# Patient Record
Sex: FEMALE | Race: WHITE | Employment: FULL TIME | URBAN - METROPOLITAN AREA
[De-identification: names, ages, dates, MRNs, and addresses within clinical notes are randomized per-mention and may not be internally consistent; named-entity substitution may affect disease eponyms.]

---

## 2017-09-14 ENCOUNTER — HOSPITAL ENCOUNTER (OUTPATIENT)
Dept: SURGERY | Age: 42
Discharge: HOME | End: 2017-09-14
Attending: OBSTETRICS & GYNECOLOGY | Admitting: OBSTETRICS & GYNECOLOGY

## 2017-09-14 VITALS
DIASTOLIC BLOOD PRESSURE: 61 MMHG | HEART RATE: 83 BPM | OXYGEN SATURATION: 100 % | SYSTOLIC BLOOD PRESSURE: 115 MMHG | RESPIRATION RATE: 12 BRPM | BODY MASS INDEX: 22.66 KG/M2 | HEIGHT: 66 IN | TEMPERATURE: 97.3 F | WEIGHT: 141 LBS

## 2017-09-14 LAB — PREGNANCY, URINE: NEGATIVE

## 2017-09-14 RX ORDER — MORPHINE SULFATE 2 MG/ML
2 INJECTION, SOLUTION INTRAMUSCULAR; INTRAVENOUS EVERY 5 MIN PRN
Status: DISCONTINUED | OUTPATIENT
Start: 2017-09-14 | End: 2017-09-15 | Stop reason: HOSPADM

## 2017-09-14 RX ORDER — ONDANSETRON 2 MG/ML
4 INJECTION INTRAMUSCULAR; INTRAVENOUS
Status: ACTIVE | OUTPATIENT
Start: 2017-09-14 | End: 2017-09-14

## 2017-09-14 RX ORDER — SODIUM CHLORIDE 0.9 % (FLUSH) 0.9 %
10 SYRINGE (ML) INJECTION EVERY 12 HOURS SCHEDULED
Status: DISCONTINUED | OUTPATIENT
Start: 2017-09-14 | End: 2017-09-15 | Stop reason: HOSPADM

## 2017-09-14 RX ORDER — OXYCODONE HYDROCHLORIDE AND ACETAMINOPHEN 5; 325 MG/1; MG/1
2 TABLET ORAL PRN
Status: DISPENSED | OUTPATIENT
Start: 2017-09-14 | End: 2017-09-14

## 2017-09-14 RX ORDER — MEPERIDINE HYDROCHLORIDE 50 MG/ML
12.5 INJECTION INTRAMUSCULAR; INTRAVENOUS; SUBCUTANEOUS EVERY 5 MIN PRN
Status: COMPLETED | OUTPATIENT
Start: 2017-09-14 | End: 2017-09-14

## 2017-09-14 RX ORDER — LABETALOL HYDROCHLORIDE 5 MG/ML
5 INJECTION, SOLUTION INTRAVENOUS EVERY 10 MIN PRN
Status: DISCONTINUED | OUTPATIENT
Start: 2017-09-14 | End: 2017-09-15 | Stop reason: HOSPADM

## 2017-09-14 RX ORDER — LIDOCAINE HYDROCHLORIDE 10 MG/ML
1 INJECTION, SOLUTION EPIDURAL; INFILTRATION; INTRACAUDAL; PERINEURAL
Status: ACTIVE | OUTPATIENT
Start: 2017-09-14 | End: 2017-09-14

## 2017-09-14 RX ORDER — SODIUM CHLORIDE, SODIUM LACTATE, POTASSIUM CHLORIDE, CALCIUM CHLORIDE 600; 310; 30; 20 MG/100ML; MG/100ML; MG/100ML; MG/100ML
INJECTION, SOLUTION INTRAVENOUS CONTINUOUS
Status: DISCONTINUED | OUTPATIENT
Start: 2017-09-14 | End: 2017-09-15 | Stop reason: HOSPADM

## 2017-09-14 RX ORDER — IBUPROFEN 600 MG/1
600 TABLET ORAL EVERY 6 HOURS PRN
Qty: 25 TABLET | Refills: 1 | Status: SHIPPED | OUTPATIENT
Start: 2017-09-14

## 2017-09-14 RX ORDER — DIPHENHYDRAMINE HYDROCHLORIDE 50 MG/ML
12.5 INJECTION INTRAMUSCULAR; INTRAVENOUS
Status: ACTIVE | OUTPATIENT
Start: 2017-09-14 | End: 2017-09-14

## 2017-09-14 RX ORDER — MORPHINE SULFATE 2 MG/ML
1 INJECTION, SOLUTION INTRAMUSCULAR; INTRAVENOUS EVERY 5 MIN PRN
Status: DISCONTINUED | OUTPATIENT
Start: 2017-09-14 | End: 2017-09-15 | Stop reason: HOSPADM

## 2017-09-14 RX ORDER — OXYCODONE HYDROCHLORIDE AND ACETAMINOPHEN 5; 325 MG/1; MG/1
1 TABLET ORAL PRN
Status: ACTIVE | OUTPATIENT
Start: 2017-09-14 | End: 2017-09-14

## 2017-09-14 RX ORDER — SODIUM CHLORIDE 0.9 % (FLUSH) 0.9 %
10 SYRINGE (ML) INJECTION PRN
Status: DISCONTINUED | OUTPATIENT
Start: 2017-09-14 | End: 2017-09-15 | Stop reason: HOSPADM

## 2017-09-14 RX ORDER — HYDROCODONE BITARTRATE AND ACETAMINOPHEN 5; 325 MG/1; MG/1
1 TABLET ORAL EVERY 6 HOURS PRN
Qty: 8 TABLET | Refills: 0 | Status: SHIPPED | OUTPATIENT
Start: 2017-09-14 | End: 2017-09-21

## 2017-09-14 RX ORDER — HYDRALAZINE HYDROCHLORIDE 20 MG/ML
5 INJECTION INTRAMUSCULAR; INTRAVENOUS
Status: DISCONTINUED | OUTPATIENT
Start: 2017-09-14 | End: 2017-09-15 | Stop reason: HOSPADM

## 2017-09-14 RX ADMIN — SODIUM CHLORIDE, SODIUM LACTATE, POTASSIUM CHLORIDE, CALCIUM CHLORIDE: 600; 310; 30; 20 INJECTION, SOLUTION INTRAVENOUS at 06:45

## 2017-09-14 RX ADMIN — MEPERIDINE HYDROCHLORIDE 12.5 MG: 50 INJECTION INTRAMUSCULAR; INTRAVENOUS; SUBCUTANEOUS at 08:55

## 2017-09-14 RX ADMIN — MEPERIDINE HYDROCHLORIDE 12.5 MG: 50 INJECTION INTRAMUSCULAR; INTRAVENOUS; SUBCUTANEOUS at 08:45

## 2017-09-14 ASSESSMENT — PAIN SCALES - GENERAL
PAINLEVEL_OUTOF10: 0

## 2017-09-14 ASSESSMENT — PAIN - FUNCTIONAL ASSESSMENT: PAIN_FUNCTIONAL_ASSESSMENT: 0-10

## 2018-02-03 ENCOUNTER — HOSPITAL ENCOUNTER (OUTPATIENT)
Dept: MAMMOGRAPHY | Age: 43
Discharge: HOME | End: 2018-02-03
Attending: OBSTETRICS & GYNECOLOGY | Admitting: OBSTETRICS & GYNECOLOGY

## 2018-02-03 DIAGNOSIS — Z12.31 ENCOUNTER FOR SCREENING MAMMOGRAM FOR BREAST CANCER: ICD-10-CM

## 2019-03-13 ENCOUNTER — HOSPITAL ENCOUNTER (OUTPATIENT)
Dept: WOMENS IMAGING | Age: 44
Discharge: HOME | End: 2019-03-13
Payer: COMMERCIAL

## 2019-03-13 DIAGNOSIS — Z12.31 ENCOUNTER FOR SCREENING MAMMOGRAM FOR BREAST CANCER: ICD-10-CM

## 2019-03-13 PROCEDURE — 77067 SCR MAMMO BI INCL CAD: CPT

## 2020-11-24 ENCOUNTER — HOSPITAL ENCOUNTER (OUTPATIENT)
Dept: WOMENS IMAGING | Age: 45
Discharge: HOME | End: 2020-11-24
Payer: COMMERCIAL

## 2020-11-24 PROCEDURE — 77063 BREAST TOMOSYNTHESIS BI: CPT

## 2020-11-25 ENCOUNTER — TELEPHONE (OUTPATIENT)
Dept: WOMENS IMAGING | Age: 45
End: 2020-11-25

## 2020-11-25 NOTE — TELEPHONE ENCOUNTER
Reviewed screening mammogram results with patient. Patient verbalized understanding. Patient has been scheduled for additional imaging.     Kian Ruff RN

## 2020-12-02 ENCOUNTER — HOSPITAL ENCOUNTER (OUTPATIENT)
Dept: WOMENS IMAGING | Age: 45
Discharge: HOME | End: 2020-12-02
Payer: COMMERCIAL

## 2020-12-02 ENCOUNTER — HOSPITAL ENCOUNTER (OUTPATIENT)
Dept: WOMENS IMAGING | Age: 45
End: 2020-12-02
Payer: COMMERCIAL

## 2020-12-02 PROCEDURE — G0279 TOMOSYNTHESIS, MAMMO: HCPCS

## 2021-02-04 ENCOUNTER — HOSPITAL ENCOUNTER (OUTPATIENT)
Dept: PHYSICAL THERAPY | Age: 46
Setting detail: THERAPIES SERIES
Discharge: HOME | End: 2021-02-04
Payer: COMMERCIAL

## 2021-02-04 PROCEDURE — 97530 THERAPEUTIC ACTIVITIES: CPT

## 2021-02-04 PROCEDURE — 97162 PT EVAL MOD COMPLEX 30 MIN: CPT

## 2021-02-04 PROCEDURE — 97110 THERAPEUTIC EXERCISES: CPT

## 2021-02-04 NOTE — FLOWSHEET NOTE
[facility]  [facility]. and Therapy, [facility], [address] [facility], [address]  Phone: [phone]  Fax [phone]        Physical Therapy Daily Treatment Note    Date:  2021    Patient Name:  Mani Berrios   \"Yesi\"  :  1975  MRN: [id number]  Restrictions/Precautions:  Universal   Medical/Treatment Diagnosis Information:  · Diagnosis: Diastasis Recti  Insurance/Certification information:  PT Insurance Information: [place]  Physician Information:  Referring Practitioner: Trudi Hwang MD  Plan of care signed (Y/N):  N, faxed  Visit# / total visits:       G-Code (if applicable):          PFDI: 74/300    Medicare Cap (if applicable):  n/a = total amount used, updated 2021    Time in:   9:05am     Timed Treatment: 70min. Total Treatment Time:  85min.  ________________________________________________________________________________________    Pain Level:    /10  SUBJECTIVE:  See eval    OBJECTIVE:     Exercise (TE) Resistance/Repetitions Other comments            PF strengthening        Short hold: (1sec) 10 times       Long hold: (10sec) 10 times       Hook-lying TA Until achieved  [id number]. Other Treatment:   TA:  Bladder re-training/education:     Bladder Diary: patient educated on importance of filling out bladder diary at home, complete with fluid intake, voids, and leakage when applicable. Voiding: patient educated on normal voiding and urinary cycle and the physiology of bladder control muscles and pelvic floor. The patient was educated on bladder dysfunction as well as IGNACIO with urethral involvement. The patient was also educated on prolapse and it's affect on urination and pain.     Dietary:     Other: core stabilization, abdominal stabilization, diastasis recti  Manual Treatments:  --       Modalities: --     Test/Measurements:  See eval           ASSESSMENT: see eval

## 2021-02-04 NOTE — FLOWSHEET NOTE
[facility], [facility], [address], [address]   Phone: [phone]  Fax [phone]      Physical Therapy Pelvic Floor Evaluation    Thank you for this referral, please sign and return fax to [phone]    If you have any questions or concerns, please don't hesitate to call. Thank you for your referral.    Physician Signature:________________________________Date:__________________  By signing above, therapists plan is approved by physician    [id number]     Patient: Darnell Meza \"Yesi\" : 1975  MRN: [id number]    This is a 42 y.o. female referred by Ganesh Beckett MD to [place] [address] with a primary diagnosis of: Diastasis Recti    Onset Date: 2013  Next MD appt: PRN    C-SSRS Triggered by Intake questionnaire (Past 2 wk assessment):   [x] No, Questionnaire did not trigger screening.   [] Yes, Patient intake triggered further evaluation      [] C-SSRS Screening completed  [] PCP notified via Plan of Care  [] Emergency services notified     Subjective:   Patient history: The patient reports she has had 2 vaginal births, noticed separation about 2 years after the last baby was born in . Reports \"I just know I still look pregnant\". Reports she feels weak and has occasional urinary leakage. Reports she feels some pressure and uncomfortable in the vagina. States she has some difficulty with constipation and decreased ease of bowel movements. Denies intercourse pain. States she regularly works out and has some back discomfort. Notices decreased abdominal strength overall. Denies history of sexual abuse/trauma. 2nd person present during evaluation today? Declined  What do you feel is your problem? weakness  When did you problem first start? About 2 years after the last baby  Has your problem been getting worse, stay the same, or getting better?  About the same Have you ever had treatment for this problem? no    4 week or greater of failed trial of PFPT program? no   PFPT program as defined by \"Completing 4 weeks of an ordered plan of pelvic muscle exercises designed to increase periurethral muscle strength\". Urinary frequency? Daytime? YES Nighttime? NO  Bowel problems? Some constipation   Urinary or bowel leakage? Some urinary   Leakage frequency? Occasionally   Protection: none      Pregnancy:   # of pregnancies: 2   # of deliveries: 2 vaginal, (8lbs, 15oz) (9lbs, 15oz)   Episiotomy: yes   Normal post- healing? yes  Are you having regular menstrual cycles? yes  Date of last pap smear?     Pain: current level of pain: 0      Sensation/neurovascular: WNL    Diagnostic tests: none to date     Precautions/Contraindications: universal     Past Medical History:   Diagnosis Date    Allergic rhinitis      Past Surgical History:   Procedure Laterality Date    CATARACT REMOVAL WITH IMPLANT Left     born with cataract    DILATION AND CURETTAGE OF UTERUS  2017    DILATATION AND CURETTAGE, HYSTEROSCOPY WITH MYOSURE ENDOMETRIAL POLYPECTOMY AND NOVASURE ENDOMETRIAL ABLATION    WISDOM TOOTH EXTRACTION       Not in a hospital admission.     Current Outpatient Medications:     ibuprofen (ADVIL;MOTRIN) 600 MG tablet, Take 1 tablet by mouth every 6 hours as needed for Pain, Disp: 25 tablet, Rfl: 1    ferrous sulfate 325 (65 Fe) MG tablet, Take 325 mg by mouth daily (with breakfast), Disp: , Rfl:     fluticasone (VERAMYST) 27.5 MCG/SPRAY nasal spray, 2 sprays by Nasal route daily, Disp: , Rfl:     guaiFENesin 400 MG tablet, Take 400 mg by mouth 2 times daily as needed for Cough, Disp: , Rfl:   No Known Allergies      Objective:    PFDI score: 74/300    Observation:   Posture: WFL   Gait analysis: WFL  Lumbar Mobility: WNL  Scar Location/Mobility: n/a  Diastasis Recti (in finger width): Above: 0  At umbilicus: 3 to 2  Below:2 to 1  Abdominal TA strength: 3/5 Special Tests:  Sacroiliac Test:   Gaenslen: negative      Lumbar Spine:   Slump test: negative       Neuro:   Sensation: (B) UEs: WNL   Sensation: (B) LEs: WNL   Anal Sensation: intact to light touch   Reflexes:     Anal: present    Cough: present     Pelvic Floor Exam  External:  Skin Condition: WNL  Sensation: intact  Palpation: no point tenderness   Tone: hypotonic  Perineal Body Mobility:    Voluntary PFM Contraction: present (normal)   Voluntary PFM Relaxation: present (normal)   Involuntary PFM Contraction/Cough Reflex: present (normal)   Involuntary PFM Relaxation: present (normal)  Perineal Body Descent:   Rest: supported   Bearing down: absent (normal-cephalad)    Internal:  Sensation: intact  Palpation: no point tenderness   Vaginal Vault Size: WNL  PERFECT:   Power: 4/5   Endurance: 10sec. Decreasing strength at 7sec. Repetitions: 10   Fast Twitch: 7   Elevation: present   Co-contraction: NOT PRESENT   Timing: present   OI strength: 4/5 B  Pelvic Organ Prolapse: Grade 1 cystocele, Grade 1 rectocele        Treatment: see flowsheet    Assessment:  Impression: The patient is a 41yo female referred to PT due to diastasis recti. The patient demonstrated moderate diastasis recti upon examination this date. The patient is standing in anterior pelvic tilt posturing. The patient demonstrated some decreased strength and endurance in pelvic floor, and no co-contraction with pelvic floor contraction. The patient demonstrated and verbalized good understanding of plan of care and should improve with PT and HEP compliance. Time Frame: 8 weeks. Rehab Potential: good    Goals:  1. Patient will demonstrate independence with HEP to self-manage symptoms. 2. Patient will improve diastasis recti by 1 finger width at umbilicus level to improve abdominal stability. 3. Patient will improve pelvic floor endurance to 10 seconds without decreasing strength to improve continence.

## 2021-02-11 ENCOUNTER — HOSPITAL ENCOUNTER (OUTPATIENT)
Dept: PHYSICAL THERAPY | Age: 46
Setting detail: THERAPIES SERIES
Discharge: HOME | End: 2021-02-11
Payer: COMMERCIAL

## 2021-02-11 PROCEDURE — 97110 THERAPEUTIC EXERCISES: CPT

## 2021-02-11 PROCEDURE — 97530 THERAPEUTIC ACTIVITIES: CPT

## 2021-02-11 NOTE — FLOWSHEET NOTE
[facility], [facility], [address], [address]  Phone: [phone]  Fax [phone]        Physical Therapy Daily Treatment Note    Date:  2021    Patient Name:  Kamryn Gramajo   \"Yesi\"  :  1975  MRN: [id number]  Restrictions/Precautions:  Universal   Medical/Treatment Diagnosis Information:  · Diagnosis: Diastasis Recti  Insurance/Certification information:  PT Insurance Information: [place]  Physician Information:  Referring Practitioner: Joe Perla MD  Plan of care signed (Y/N):  Y  Visit# / total visits:       G-Code (if applicable):          PFDI: 74/300    Medicare Cap (if applicable):  n/a = total amount used, updated 2021    Time in:   3:30pm     Timed Treatment: 45min. Total Treatment Time:  45min.  ________________________________________________________________________________________    Pain Level:    0/10  SUBJECTIVE:  Patient reports \"I feel like I am having trouble finding time to do the exercises\". OBJECTIVE:     Exercise (TE) Resistance/Repetitions Other comments            PF strengthening        Short hold: (1sec) 10 times       Long hold: (10sec) 10 times       Hook-lying TA Until achieved  [id number]. KFO: x10  March: x10 W/ BP cuff          PF + hip ABD x10 w/ blue tband    PF + hip ADD x10 w/ ball squeeze                      Access Code: [email or web address]   URL: [email or web address]   Date: 2021   Prepared by: Jamal Goodson     Exercises   Seated Pelvic Floor Contraction - 10 reps - 1 sets - 1sec. hold - 1x daily - 7x weekly   Seated Pelvic Floor Contraction - 10 reps - 1 sets - 5-10sec. hold - 1x daily - 7x weekly   Seated Pelvic Floor Contraction with Isometric Hip Adduction - 10 reps - 1 sets - 1x daily - 7x weekly   Seated Pelvic Floor Contraction with Hip Abduction and Resistance Loop - 10 reps - 1 sets - 1x daily - 7x weekly Hooklying Transversus Abdominis Palpation - 10 reps - 1 sets - 10sec. hold - 1x daily - 7x weekly   Bent Knee Fallouts - 10 reps - 1 sets - 1x daily - 7x weekly   Supine March - 10 reps - 1 sets - 1x daily - 7x weekly     Other Treatment:   TA:  Bladder re-training/education:     Bladder Diary: 8-12 voids/day, 0 leaks, 1-2 BM/day    Voiding:advised to continue to void appropriate amount of times/day    Dietary: education on fiber intake, to increase to improve consistency of bowel movements to prevent constipation and to improve prolapse management. Other: core stabilization, abdominal stabilization, diastasis recti  Manual Treatments:  --       Modalities: --     Test/Measurements:  See eval           ASSESSMENT: The patient is progressing nicely with PT. The patient performed above TE well with no increase in pain. Required verbal and tactile cues for correct activation. Gave patient updated HEP. No pain reported at end of PT session. Patient will follow up in 2 weeks. Treatment/Activity Tolerance:   [x] Patient tolerated treatment well [] Patient limited by fatique  [] Patient limited by pain [] Patient limited by other medical complications  [] Other:     Goals:      Time Frame: 8 weeks. Rehab Potential: good     Goals:  1. Patient will demonstrate independence with HEP to self-manage symptoms. 2. Patient will improve diastasis recti by 1 finger width at umbilicus level to improve abdominal stability. 3. Patient will improve pelvic floor endurance to 10 seconds without decreasing strength to improve continence. 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 5. Patient will improve PFDI score by at least 25 points demonstrating improved pelvic floor functioning and quality of life.          Plan: [x] Continue per plan of care [] Alter current plan (see comments) [] Plan of care initiated [] Hold pending MD visit [] Discharge      Plan for Next Session: review diary, review HEP, advance as needed     Re-Certification Due Date:   Visit 12      Signature:  Shahnaz Phillips PT, DPT

## 2021-02-16 ENCOUNTER — APPOINTMENT (OUTPATIENT)
Dept: PHYSICAL THERAPY | Age: 46
End: 2021-02-16
Payer: COMMERCIAL

## 2021-02-23 ENCOUNTER — HOSPITAL ENCOUNTER (OUTPATIENT)
Dept: PHYSICAL THERAPY | Age: 46
Setting detail: THERAPIES SERIES
Discharge: HOME | End: 2021-02-23
Payer: COMMERCIAL

## 2021-02-23 PROCEDURE — 97140 MANUAL THERAPY 1/> REGIONS: CPT

## 2021-02-23 PROCEDURE — 97110 THERAPEUTIC EXERCISES: CPT

## 2021-02-23 PROCEDURE — 97530 THERAPEUTIC ACTIVITIES: CPT

## 2021-02-23 NOTE — FLOWSHEET NOTE
sets - 1x daily - 7x weekly   Hooklying Transversus Abdominis Palpation - 10 reps - 1 sets - 10sec. hold - 1x daily - 7x weekly   Bent Knee Fallouts - 10 reps - 1 sets - 1x daily - 7x weekly   Supine March - 10 reps - 1 sets - 1x daily - 7x weekly     Other Treatment:   TA:  Bladder re-training/education:     Bladder Diary: 8-12 voids/day, 0 leaks, 1-2 BM/day    Voiding:advised to continue to void appropriate amount of times/day    Dietary: education on fiber intake, to increase to improve consistency of bowel movements to prevent constipation and to improve prolapse management. Other: core stabilization, abdominal stabilization, diastasis recti, posturing cues in standing/sitting/laying     Manual Treatments: tactile and verbal feedback with pelvic floor contractions. Modalities: --     Test/Measurements:  See eval           ASSESSMENT: The patient is progressing nicely with PT. The patient demonstrated a fair co-contraction today upon examination. The patient performed above TE well with no increase in pain. Required verbal and tactile cues for correct activation. Gave patient updated HEP. No pain reported at end of PT session. Patient will follow up in 3 weeks. Treatment/Activity Tolerance:   [x] Patient tolerated treatment well [] Patient limited by fatique  [] Patient limited by pain [] Patient limited by other medical complications  [] Other:     Goals:      Time Frame: 8 weeks. Rehab Potential: good     Goals:  1. Patient will demonstrate independence with HEP to self-manage symptoms. 2. Patient will improve diastasis recti by 1 finger width at umbilicus level to improve abdominal stability. 3. Patient will improve pelvic floor endurance to 10 seconds without decreasing strength to improve continence. 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   5. Patient will improve PFDI score by at least 25 points demonstrating improved pelvic floor functioning and quality of life.          Plan: [x] Continue per plan of care [] Alter current plan (see comments)   [] Plan of care initiated [] Hold pending MD visit [] Discharge      Plan for Next Session: review/advance HEP, advance as needed     Re-Certification Due Date:   Visit 16      Signature:  Kallie Truong, PT, DPT

## 2021-03-09 ENCOUNTER — APPOINTMENT (OUTPATIENT)
Dept: PHYSICAL THERAPY | Age: 46
End: 2021-03-09
Payer: COMMERCIAL

## 2021-03-30 ENCOUNTER — HOSPITAL ENCOUNTER (OUTPATIENT)
Dept: PHYSICAL THERAPY | Age: 46
Setting detail: THERAPIES SERIES
Discharge: HOME | End: 2021-03-30
Payer: COMMERCIAL

## 2021-03-30 PROCEDURE — 97530 THERAPEUTIC ACTIVITIES: CPT

## 2021-03-30 PROCEDURE — 97110 THERAPEUTIC EXERCISES: CPT

## 2021-03-30 NOTE — FLOWSHEET NOTE
[facility], [facility], [address], [address]  Phone: [phone]  Fax [phone]        Physical Therapy Daily Treatment Note    Date:  3/30/2021    Patient Name:  [place]   \"Yesi\"  :  1975  MRN: [id number]  Restrictions/Precautions:  Universal   Medical/Treatment Diagnosis Information:  · Diagnosis: Diastasis Recti  Insurance/Certification information:  PT Insurance Information: [place]  Physician Information:  Referring Practitioner: Maranda Ortiz MD  Plan of care signed (Y/N):  Y  Visit# / total visits:       G-Code (if applicable):          PFDI: 74/300    Medicare Cap (if applicable):  n/a = total amount used, updated 3/30/2021    Time in:  4:20pm     Timed Treatment: 50min. Total Treatment Time:  50min.  ________________________________________________________________________________________    Pain Level:    0/10  SUBJECTIVE:  Patient reports \"I am doing better, I just am not getting my exercises in all of the days\". *re-eval next visit*    OBJECTIVE:     Exercise (TE) Resistance/Repetitions Other comments            PF strengthening        Short hold: (1sec) 10 times       Long hold: (10sec) 10 times       Hook-lying TA in 90/90 Until achieved  [id number]. KFO: x10  March: x10           PF + hip ABD x10 w/ blue tband    PF + hip ADD x10 w/ ball squeeze         Ryleehell x10    Access Code: [email or web address] URL: [email or web address]/Date: 1Prepared by: Ye Candelaria   Seated Pelvic Floor Contraction - 1 x daily - 7 x weekly - 10 reps - 1 sets - 1sec. hold   Seated Pelvic Floor Contraction - 1 x daily - 7 x weekly - 10 reps - 1 sets - 5-10sec. hold   Seated Pelvic Floor Contraction with Isometric Hip Adduction - 1 x daily - 7 x weekly - 10 reps - 1 sets   Seated Pelvic Floor Contraction with Hip Abduction and Resistance Loop - 1 x daily - 7 x weekly - 10 reps - 1 sets   Hooklying Transversus Abdominis Palpation - 1 x daily - 7 x weekly - 10 reps - 1 sets - 10sec. hold   Bent Knee Fallouts - 1 x daily - 7 x weekly - 10 reps - 1 sets   Supine March - 1 x daily - 7 x weekly - 10 reps - 1 sets   Supine 90/90 Abdominal Bracing - 1 x daily - 7 x weekly - 10 reps - 3 sets   Straight Leg Raise with Opposite Hip and Knee Flexion - 1 x daily - 7 x weekly - 10 reps - 3 sets      Other Treatment:   TA:  Bladder re-training/education:         Other: core stabilization, abdominal stabilization, diastasis recti, posturing cues in standing/sitting/laying, modifications of current workouts and protection of pelvic floor/core/back. Manual Treatments:      Modalities: --     Test/Measurements:  See eval           ASSESSMENT: The patient is progressing nicely with PT, adapted nicely to increased difficulty with exercises. The patient demonstrated a fair co-contraction today upon examination, though with verbal and tactile feedback, patient improved. The patient performed above TE well with no increase in pain. Gave patient updated HEP. No pain reported at end of PT session. Patient will follow up in 3 weeks. *re-eval next visit*    Treatment/Activity Tolerance:   [x] Patient tolerated treatment well [] Patient limited by fatique  [] Patient limited by pain [] Patient limited by other medical complications  [] Other:     Goals:      Time Frame: 8 weeks. Rehab Potential: good     Goals:  1. Patient will demonstrate independence with HEP to self-manage symptoms. 2. Patient will improve diastasis recti by 1 finger width at umbilicus level to improve abdominal stability. 3. Patient will improve pelvic floor endurance to 10 seconds without decreasing strength to improve continence. 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   5. Patient will improve PFDI score by at least 25 points demonstrating

## 2021-04-28 ENCOUNTER — HOSPITAL ENCOUNTER (OUTPATIENT)
Dept: PHYSICAL THERAPY | Age: 46
Setting detail: THERAPIES SERIES
Discharge: HOME | End: 2021-04-28
Payer: COMMERCIAL

## 2021-04-28 PROCEDURE — 97110 THERAPEUTIC EXERCISES: CPT

## 2021-04-28 PROCEDURE — 97530 THERAPEUTIC ACTIVITIES: CPT

## 2021-04-28 PROCEDURE — 97140 MANUAL THERAPY 1/> REGIONS: CPT

## 2021-04-28 NOTE — FLOWSHEET NOTE
[facility]  [address] [place] [address], [address] [place] [address] [address]  Phone: [phone]   Fax: [phone]        Physical Therapy Daily Treatment Note, Re-assessment, D/C summary    Date:  2021    Patient Name:  Jeremías Scott   \"Yesi\"  :  1975  MRN: [id number]  Restrictions/Precautions:  Universal   Medical/Treatment Diagnosis Information:  · Diagnosis: Diastasis Recti  Insurance/Certification information:  PT Insurance Information: [place]  Physician Information:  Referring Practitioner: Kizzy Villalpando MD  Plan of care signed (Y/N):  Y  Visit# / total visits:  5 (2021 to 2021)     G-Code (if applicable):          PFDI: 47/300 (was 74/300)    Medicare Cap (if applicable):  n/a = total amount used, updated 2021    Time in:  3:45pm     Timed Treatment: 45min. Total Treatment Time:  45min.  ________________________________________________________________________________________    Pain Level:    0/10  SUBJECTIVE:  Patient reports \"I haven't really had the time to do all of my exercises, I feel like I am straining sometimes\". \"I do feel like the diastasis is getting a little better\". \"I do think I am ready to do things on my own\". Reports compliance with HEP. OBJECTIVE:     Exercise (TE) Resistance/Repetitions Other comments            PF strengthening        Short hold: (1sec) 10 times       Long hold: (10sec) 10 times       Hook-lying TA in 90/90 Until achieved  [id number]. KFO: x10  March: x10           PF + hip ABD x10 w/ blue tband    PF + hip ADD x10 w/ ball squeeze         Day[place]al x10    Access Code: [email or web address] URL: [email or web address]. com/Date: 1Prepared by: Darcel Cooks   Seated Pelvic Floor Contraction - 1 x daily - 7 x weekly - 10 reps - 1 sets - 1sec. hold   Seated Pelvic Floor Contraction - 1 x daily - 7 x weekly - 10 reps - 1 sets - 5-10sec. hold   Seated Pelvic Floor Contraction with Isometric Hip Adduction - 1 x daily - 7 x weekly - 10 reps - 1 sets   Seated Pelvic Floor Contraction with Hip Abduction and Resistance Loop - 1 x daily - 7 x weekly - 10 reps - 1 sets   Hooklying Transversus Abdominis Palpation - 1 x daily - 7 x weekly - 10 reps - 1 sets - 10sec. hold   Bent Knee Fallouts - 1 x daily - 7 x weekly - 10 reps - 1 sets   Supine March - 1 x daily - 7 x weekly - 10 reps - 1 sets   Supine 90/90 Abdominal Bracing - 1 x daily - 7 x weekly - 10 reps - 3 sets   Straight Leg Raise with Opposite Hip and Knee Flexion - 1 x daily - 7 x weekly - 10 reps - 3 sets      Other Treatment:   TA:  Bladder re-training/education:         Other: core stabilization, abdominal stabilization, diastasis recti, posturing cues in standing/sitting/laying, modifications of current workouts and protection of pelvic floor/core/back, pelvic floor maintenance. Manual Treatments:      Modalities: --     Test/Measurements:    Diastasis Recti (in finger width): Above: 0  At umbilicus: 2 to 1.5 (was 3 to 2)  Below: 1 to .5 (was 2 to 1)    Sensation: intact  Palpation: no point tenderness   Vaginal Vault Size: WNL  PERFECT:              Power: 4/5              Endurance: 10sec. (was 10sec. Decreasing strength at 7sec.)              Repetitions: 10              Fast Twitch: 10 (was 7)              Elevation: present              Co-contraction: present (was NOT PRESENT)              Timing: present   OI strength: 4/5 B  Pelvic Organ Prolapse: Grade 1 cystocele, Grade 1 rectocele          ASSESSMENT: After 5 PFPT visits, the patient has made significant progress. The patient demonstrated improved pelvic floor strength, endurance and overall coordination. The patient also demonstrated improved diastasis and abdominal strength/control. The patient will continue with above HEP and follow up with MD/PT if further problems arise.      Treatment/Activity Tolerance:   [x] Patient tolerated treatment well [] Patient limited by linda  [] Patient limited by pain [] Patient limited by other medical complications  [] Other:     Goals:      Time Frame: 8 weeks. Rehab Potential: good     Goals:  1. Patient will demonstrate independence with HEP to self-manage symptoms. (MET)  2. Patient will improve diastasis recti by 1 finger width at umbilicus level to improve abdominal stability. (MET)  3. Patient will improve pelvic floor endurance to 10 seconds without decreasing strength to improve continence. 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 (MET)  5.  Patient will improve PFDI score by at least 25 points demonstrating improved pelvic floor functioning and quality of life. (MET)        Plan: [] Continue per plan of care [] Alter current plan (see comments)   [] Plan of care initiated [] Hold pending MD visit [x] Discharge       Signature:  Nakia Cody, PT, DPT

## 2022-03-09 ENCOUNTER — HOSPITAL ENCOUNTER (OUTPATIENT)
Dept: WOMENS IMAGING | Age: 47
Discharge: HOME | End: 2022-03-09
Payer: COMMERCIAL

## 2022-03-09 VITALS — WEIGHT: 140 LBS | BODY MASS INDEX: 22.5 KG/M2 | HEIGHT: 66 IN

## 2022-03-09 DIAGNOSIS — Z12.31 VISIT FOR SCREENING MAMMOGRAM: ICD-10-CM

## 2022-03-09 PROCEDURE — 77063 BREAST TOMOSYNTHESIS BI: CPT

## 2023-04-18 ENCOUNTER — HOSPITAL ENCOUNTER (OUTPATIENT)
Dept: WOMENS IMAGING | Age: 48
Discharge: HOME | End: 2023-04-18
Payer: COMMERCIAL

## 2023-04-18 DIAGNOSIS — Z12.31 VISIT FOR SCREENING MAMMOGRAM: ICD-10-CM

## 2023-04-18 PROCEDURE — 77063 BREAST TOMOSYNTHESIS BI: CPT

## 2023-04-28 ENCOUNTER — HOSPITAL ENCOUNTER (OUTPATIENT)
Dept: WOMENS IMAGING | Age: 48
Discharge: HOME | End: 2023-04-28
Payer: COMMERCIAL

## 2023-04-28 DIAGNOSIS — R92.8 ABNORMAL MAMMOGRAM: ICD-10-CM

## 2023-04-28 DIAGNOSIS — R92.2 DENSE BREASTS: ICD-10-CM

## 2023-04-28 DIAGNOSIS — R92.8 ABNORMAL SCREENING MAMMOGRAM: ICD-10-CM

## 2023-04-28 PROCEDURE — G0279 TOMOSYNTHESIS, MAMMO: HCPCS

## 2023-04-28 PROCEDURE — 76642 ULTRASOUND BREAST LIMITED: CPT

## 2024-06-26 ENCOUNTER — ANESTHESIA EVENT (OUTPATIENT)
Dept: ENDOSCOPY | Age: 49
End: 2024-06-26
Payer: COMMERCIAL

## 2024-06-26 NOTE — PROGRESS NOTES
Nadira A Florea    Age 49 y.o.    female    1975    [facility] [id number]    6/27/2024  Arrival Time_____________  OR Time____________30 Min     Procedure(s):  COLONOSCOPY                      Monitor Anesthesia Care    Surgeon(s):  Loida Martinez, MD       Phone [phone] (home) [phone] (work)    [facility]  Date  Info Source  Home  Cell         Work  _____________________________________________________________________  _____________________________________________________________________  _____________________________________________________________________  _____________________________________________________________________  _____________________________________________________________________    PCP _____________________________ Phone_________________     H&P  ________________  Bringing      Chart              Epic      DOS      Called________  EKG ________________   Bringing      Chart              Epic      DOS      Called________  LABS________________   Bringing     Chart              Epic      DOS      Called________  Cardiac Clearance ______ Bringing      Chart              Epic      DOS      Called________  Pulmonary Clearance____ Bringing      Chart              Epic      DOS      Called________    Cardiologist________________________ Phone___________________________  Pulmonologist_______________________Phone___________________________    ? Advance Directives   ? [employer] concerns / Waiver on Chart            PAT Communications________________  ? Pre-op Instructions Given /Understood          _________________________________  ? Directions to Surgery Center                          _________________________________  ? Transportation Home_______________      __________________________________  ? Crutches/Walker__________________        __________________________________    Orders: Hard copy/ EPIC                 Transcribed/ EPIC              _______Wt.    ________Pharmacy

## 2024-06-27 ENCOUNTER — ANESTHESIA (OUTPATIENT)
Dept: ENDOSCOPY | Age: 49
End: 2024-06-27
Payer: COMMERCIAL

## 2024-06-27 ENCOUNTER — HOSPITAL ENCOUNTER (OUTPATIENT)
Age: 49
Setting detail: OUTPATIENT SURGERY
Discharge: HOME | End: 2024-06-27
Attending: INTERNAL MEDICINE | Admitting: INTERNAL MEDICINE
Payer: COMMERCIAL

## 2024-06-27 VITALS
OXYGEN SATURATION: 99 % | HEART RATE: 67 BPM | DIASTOLIC BLOOD PRESSURE: 63 MMHG | RESPIRATION RATE: 15 BRPM | SYSTOLIC BLOOD PRESSURE: 126 MMHG | TEMPERATURE: 98.2 F

## 2024-06-27 LAB — HCG UR QL: NEGATIVE

## 2024-06-27 PROCEDURE — 3609027000 HC COLONOSCOPY: Performed by: INTERNAL MEDICINE

## 2024-06-27 PROCEDURE — 2709999900 HC NON-CHARGEABLE SUPPLY: Performed by: INTERNAL MEDICINE

## 2024-06-27 PROCEDURE — 7100000010 HC PHASE II RECOVERY - FIRST 15 MIN: Performed by: INTERNAL MEDICINE

## 2024-06-27 PROCEDURE — 6360000002 HC RX W HCPCS: Performed by: NURSE ANESTHETIST, CERTIFIED REGISTERED

## 2024-06-27 PROCEDURE — 3700000001 HC ADD 15 MINUTES (ANESTHESIA): Performed by: INTERNAL MEDICINE

## 2024-06-27 PROCEDURE — 2580000003 HC RX 258: Performed by: NURSE ANESTHETIST, CERTIFIED REGISTERED

## 2024-06-27 PROCEDURE — 7100000011 HC PHASE II RECOVERY - ADDTL 15 MIN: Performed by: INTERNAL MEDICINE

## 2024-06-27 PROCEDURE — 84703 CHORIONIC GONADOTROPIN ASSAY: CPT

## 2024-06-27 PROCEDURE — 3700000000 HC ANESTHESIA ATTENDED CARE: Performed by: INTERNAL MEDICINE

## 2024-06-27 RX ORDER — PROPOFOL 10 MG/ML
INJECTION, EMULSION INTRAVENOUS PRN
Status: DISCONTINUED | OUTPATIENT
Start: 2024-06-27 | End: 2024-06-27 | Stop reason: SDUPTHER

## 2024-06-27 RX ORDER — SODIUM CHLORIDE, SODIUM LACTATE, POTASSIUM CHLORIDE, CALCIUM CHLORIDE 600; 310; 30; 20 MG/100ML; MG/100ML; MG/100ML; MG/100ML
INJECTION, SOLUTION INTRAVENOUS CONTINUOUS PRN
Status: DISCONTINUED | OUTPATIENT
Start: 2024-06-27 | End: 2024-06-27 | Stop reason: SDUPTHER

## 2024-06-27 RX ADMIN — PROPOFOL 100 MG: 10 INJECTION, EMULSION INTRAVENOUS at 09:02

## 2024-06-27 RX ADMIN — SODIUM CHLORIDE, SODIUM LACTATE, POTASSIUM CHLORIDE, AND CALCIUM CHLORIDE: .6; .31; .03; .02 INJECTION, SOLUTION INTRAVENOUS at 08:27

## 2024-06-27 RX ADMIN — PROPOFOL 150 MCG/KG/MIN: 10 INJECTION, EMULSION INTRAVENOUS at 09:03

## 2024-06-27 ASSESSMENT — PAIN - FUNCTIONAL ASSESSMENT: PAIN_FUNCTIONAL_ASSESSMENT: 0-10

## 2024-06-27 NOTE — DISCHARGE INSTRUCTIONS
PATIENT INSTRUCTIONS  POST-SEDATION          IMMEDIATELY FOLLOWING PROCEDURE:    Do not drive or operate machinery for the first twenty four hours after surgery.     Do not make any important decisions for twenty four hours after surgery or while taking narcotic pain medications or sedatives.     You should NOT BE LEFT UNATTENDED OR ALONE. A responsible adult should be with you for the rest of the day of your procedure and also during the night for your protection and safety.    You may experience some light headedness. Rest at home with activity as tolerated. You may not need to go to bed, but it is important to rest for the next 24 hours. You should not engage in athletic sports such as basketball, volleyball, jogging, skating, or activities requiring refined motor skills for 24 hours.   If you develop intractable nausea and vomiting or a severe headache please notify your doctor immediately.   You are not expected to have any fever, but if you feel warm, take your temperature. If you have a fever 101 degrees or higher, call your doctor.     If you have had an Endoscopy:   *Eat lightly for your first meal and gradually resume your normal / prescribed diet.    *If you have had a colonoscopy, do not expect a normal bowel movement for approximately three days due to the cleansing of the large intestine prior to colonoscopy.    ONCE YOU ARE HOME, IF YOU SHOULD HAVE:  Difficulty in breathing, persistent nausea or vomiting, bleeding you feel is excessive, or pain that is unusual, increased abdominal bloating, or any swelling, fever / chills, call your physician. If you cannot contact your physician, but feel that your signs and symptoms need a physician's attention, go to the Emergency Department.      FOLLOW-UP:    Please follow up with your Primary Care Provider as scheduled or needed.    Call Loida Carl MD if there are any GI concerns. [phone]    Repeat Colonoscopy 10 years.    You may be receiving a

## 2024-06-27 NOTE — PROGRESS NOTES
Patient awake alert ready to go home. Patient and family updated per Md. Discharged in no distress accompanied to passenger side of car with family or significant other driving car. Assessment unchanged. Patient denies pain.

## 2024-06-27 NOTE — H&P
Gastroenterology Note             Pre-operative History and Physical    Patient: Nadira Lea  : 1975  CSN:     History Obtained From:  patient and/or guardian.     HISTORY OF PRESENT ILLNESS:    The patient is a 49 y.o. female  here for colonoscopy.     Past Medical History:    Past Medical History:   Diagnosis Date    Allergic rhinitis      Past Surgical History:    Past Surgical History:   Procedure Laterality Date    CATARACT REMOVAL WITH IMPLANT Left     born with cataract    DILATION AND CURETTAGE OF UTERUS  2017    DILATATION AND CURETTAGE, HYSTEROSCOPY WITH MYOSURE ENDOMETRIAL POLYPECTOMY AND NOVASURE ENDOMETRIAL ABLATION    WISDOM TOOTH EXTRACTION       Medications Prior to Admission:   No current facility-administered medications on file prior to encounter.     Current Outpatient Medications on File Prior to Encounter   Medication Sig Dispense Refill    ibuprofen (ADVIL;MOTRIN) 600 MG tablet Take 1 tablet by mouth every 6 hours as needed for Pain 25 tablet 1    ferrous sulfate 325 (65 Fe) MG tablet Take 325 mg by mouth daily (with breakfast)      fluticasone (VERAMYST) 27.5 MCG/SPRAY nasal spray 2 sprays by Nasal route daily      guaiFENesin 400 MG tablet Take 400 mg by mouth 2 times daily as needed for Cough          Allergies:  Patient has no known allergies.      Social History:   Social History     Tobacco Use    Smoking status: Never    Smokeless tobacco: Never   Substance Use Topics    Alcohol use: Yes     Comment: occas     Family History:   Family History   Problem Relation Age of Onset    Cancer Mother         brain tumor    Breast Cancer Maternal Aunt 60       PHYSICAL EXAM:      There were no vitals taken for this visit. I        Heart:   RRR, normal s1s2    Lungs:  CTA bilat,  Normal effort    Abdomen:   NT, ND      ASA Grade:  ASA 1 - Normal health patient    Mallampati Class: 2          ASSESSMENT AND PLAN:    1.  Patient is a 49 y.o. female here for Colonoscopy

## 2024-06-27 NOTE — ANESTHESIA PRE PROCEDURE
Findings:         Anesthesia Plan      MAC     ASA 1       Induction: intravenous.      Anesthetic plan and risks discussed with patient.      Plan discussed with CRNA.                  CHELSEA SAUCEDA MD   6/27/2024

## 2024-06-27 NOTE — ANESTHESIA POSTPROCEDURE EVALUATION
Department of Anesthesiology  Postprocedure Note    Patient: Nadira Lea  MRN: [id number]  YOB: 1975  Date of evaluation: 6/27/2024    Procedure Summary       Date: 06/27/24 Room / Location: [place] [address] / [facility]    Anesthesia Start: 0901 Anesthesia Stop: 0928    Procedure: COLONOSCOPY Diagnosis:       Screening for colon cancer      (Screening for colon cancer [Z12.11])    Surgeons: Loida Martinez MD Responsible Provider: Carlos Alberto Vallejo MD    Anesthesia Type: MAC ASA Status: 1            Anesthesia Type: No value filed.    Sima Phase I: Sima Score: 10    Sima Phase II: Sima Score: 10    Anesthesia Post Evaluation    Patient location during evaluation: PACU  Level of consciousness: awake  Airway patency: patent  Nausea & Vomiting: no nausea  Cardiovascular status: blood pressure returned to baseline  Respiratory status: acceptable  Hydration status: euvolemic  Pain management: adequate    No notable events documented.

## 2024-06-27 NOTE — PROGRESS NOTES
to bedside updated with no questions. Discharge instructions reviewed with patient and responsible adult. Discharge instructions given with no additional questions. Patient to be discharged home with belongings.

## 2024-09-09 ENCOUNTER — HOSPITAL ENCOUNTER (OUTPATIENT)
Dept: PHYSICAL THERAPY | Age: 49
Setting detail: THERAPIES SERIES
Discharge: HOME | End: 2024-09-09
Payer: COMMERCIAL

## 2024-09-09 DIAGNOSIS — N81.89 PELVIC FLOOR WEAKNESS IN FEMALE: Primary | ICD-10-CM

## 2024-09-09 DIAGNOSIS — M62.89 PELVIC FLOOR TENSION: ICD-10-CM

## 2024-09-09 PROCEDURE — 97161 PT EVAL LOW COMPLEX 20 MIN: CPT

## 2024-09-09 PROCEDURE — 97530 THERAPEUTIC ACTIVITIES: CPT

## 2024-09-09 PROCEDURE — 97110 THERAPEUTIC EXERCISES: CPT

## 2024-09-09 RX ORDER — TAMOXIFEN CITRATE 10 MG/1
10 TABLET ORAL DAILY
COMMUNITY

## 2024-09-18 ENCOUNTER — HOSPITAL ENCOUNTER (OUTPATIENT)
Dept: PHYSICAL THERAPY | Age: 49
Setting detail: THERAPIES SERIES
Discharge: HOME | End: 2024-09-18
Payer: COMMERCIAL

## 2024-09-18 PROCEDURE — 97530 THERAPEUTIC ACTIVITIES: CPT

## 2024-09-24 ENCOUNTER — HOSPITAL ENCOUNTER (OUTPATIENT)
Dept: PHYSICAL THERAPY | Age: 49
Setting detail: THERAPIES SERIES
Discharge: HOME | End: 2024-09-24
Payer: COMMERCIAL

## 2024-09-24 PROCEDURE — 97110 THERAPEUTIC EXERCISES: CPT

## 2024-10-08 ENCOUNTER — HOSPITAL ENCOUNTER (OUTPATIENT)
Dept: PHYSICAL THERAPY | Age: 49
Setting detail: THERAPIES SERIES
Discharge: HOME | End: 2024-10-08
Payer: COMMERCIAL

## 2024-10-08 PROCEDURE — 97110 THERAPEUTIC EXERCISES: CPT

## 2024-10-08 NOTE — FLOWSHEET NOTE
Patient will return to functional activities including working a 7 hour shift and working out without increased symptoms or restriction.   [] Progressing: [] Met: [] Not Met: [] Adjusted       Overall Progression Towards Functional goals/ Treatment Progress Update:  [] Patient is progressing as expected towards functional goals listed.    [] Progression is slowed due to complexities/Impairments listed.  [] Progression has been slowed due to co-morbidities.  [x] Plan just implemented, too soon (<30days) to assess goals progression   [] Goals require adjustment due to lack of progress  [] Patient is not progressing as expected and requires additional follow up with physician  [] Other:     TREATMENT PLAN     Plan: POC initiated as per evaluation    Electronically Signed by Karol Becker, PT  Date: 10/08/2024     Note: Portions of this note have been templated and/or copied from initial evaluation, reassessments and prior notes for documentation efficiency.    Note: If patient does not return for scheduled/recommended follow up visits, this note will serve as a discharge from care along with the most recent update on progress.

## 2024-10-22 ENCOUNTER — HOSPITAL ENCOUNTER (OUTPATIENT)
Dept: PHYSICAL THERAPY | Age: 49
Setting detail: THERAPIES SERIES
Discharge: HOME | End: 2024-10-22
Payer: COMMERCIAL

## 2024-10-22 PROCEDURE — 97110 THERAPEUTIC EXERCISES: CPT

## 2024-10-22 PROCEDURE — 97530 THERAPEUTIC ACTIVITIES: CPT

## (undated) DEVICE — ENDOSCOPIC KIT 2 12 FT OP4 DE2 GWN SYR

## (undated) DEVICE — ELECTRODE,ECG,STRESS,FOAM,3PK: Brand: MEDLINE

## (undated) DEVICE — CANNULA NSL AD TBNG L7FT PVC STR NONFLARED PRNG O2 DEL W STD